# Patient Record
Sex: MALE | URBAN - METROPOLITAN AREA
[De-identification: names, ages, dates, MRNs, and addresses within clinical notes are randomized per-mention and may not be internally consistent; named-entity substitution may affect disease eponyms.]

---

## 2023-07-13 ENCOUNTER — APPOINTMENT (RX ONLY)
Dept: URBAN - METROPOLITAN AREA CLINIC 124 | Facility: CLINIC | Age: 43
Setting detail: DERMATOLOGY
End: 2023-07-13

## 2023-07-13 DIAGNOSIS — R21 RASH AND OTHER NONSPECIFIC SKIN ERUPTION: ICD-10-CM

## 2023-07-13 DIAGNOSIS — L50.3 DERMATOGRAPHIC URTICARIA: ICD-10-CM

## 2023-07-13 PROCEDURE — ? COUNSELING

## 2023-07-13 PROCEDURE — 99203 OFFICE O/P NEW LOW 30 MIN: CPT

## 2023-07-13 PROCEDURE — ? TREATMENT REGIMEN

## 2023-07-13 PROCEDURE — ? ADDITIONAL NOTES

## 2023-07-13 PROCEDURE — ? PRESCRIPTION

## 2023-07-13 RX ORDER — AMMONIUM LACTATE 12 G/100G
LOTION TOPICAL
Qty: 225 | Refills: 11 | Status: ERX | COMMUNITY
Start: 2023-07-13

## 2023-07-13 RX ADMIN — AMMONIUM LACTATE: 12 LOTION TOPICAL at 00:00

## 2023-07-13 NOTE — PROCEDURE: TREATMENT REGIMEN
Otc Regimen: Wash hand QD with CeraVe renewing SA cleanser
Initiate Treatment: ammonium lactate 12 % lotion twice a day
Detail Level: Zone

## 2023-07-13 NOTE — PROCEDURE: ADDITIONAL NOTES
Additional Notes: Present for years according to patient. They do not itch.
Render Risk Assessment In Note?: no
Detail Level: Simple